# Patient Record
Sex: FEMALE | Race: WHITE
[De-identification: names, ages, dates, MRNs, and addresses within clinical notes are randomized per-mention and may not be internally consistent; named-entity substitution may affect disease eponyms.]

---

## 2018-09-24 ENCOUNTER — HOSPITAL ENCOUNTER (OUTPATIENT)
Dept: HOSPITAL 92 - BICMAMMO | Age: 48
Discharge: HOME | End: 2018-09-24
Attending: OBSTETRICS & GYNECOLOGY
Payer: COMMERCIAL

## 2018-09-24 DIAGNOSIS — Z12.31: Primary | ICD-10-CM

## 2018-09-24 DIAGNOSIS — R92.1: ICD-10-CM

## 2018-09-24 PROCEDURE — 77067 SCR MAMMO BI INCL CAD: CPT

## 2018-09-24 PROCEDURE — 77063 BREAST TOMOSYNTHESIS BI: CPT

## 2019-10-03 ENCOUNTER — HOSPITAL ENCOUNTER (OUTPATIENT)
Dept: HOSPITAL 92 - BICULT | Age: 49
Discharge: HOME | End: 2019-10-03
Attending: OBSTETRICS & GYNECOLOGY
Payer: COMMERCIAL

## 2019-10-03 DIAGNOSIS — N63.10: Primary | ICD-10-CM

## 2019-10-03 NOTE — ULT
ULTRASOUND RIGHT BREAST:

 

Date:  10/03/19 

 

HISTORY:  

Additional views in call-back from mammogram. 

 

COMPARISON:  

Mammogram from 09/26/19. 

 

FINDINGS:

Corresponding to the multiple masses of the right breast are multiple simple cysts. 

 

IMPRESSION: 

BI-RADS Category 2 - Benign findings. Continued screening is recommended. 

 

POS: OFF